# Patient Record
Sex: MALE | Race: OTHER | HISPANIC OR LATINO | URBAN - METROPOLITAN AREA
[De-identification: names, ages, dates, MRNs, and addresses within clinical notes are randomized per-mention and may not be internally consistent; named-entity substitution may affect disease eponyms.]

---

## 2020-06-29 ENCOUNTER — OFFICE VISIT (OUTPATIENT)
Dept: URBAN - METROPOLITAN AREA CLINIC 63 | Facility: CLINIC | Age: 45
End: 2020-06-29

## 2022-06-04 ENCOUNTER — TELEPHONE ENCOUNTER (OUTPATIENT)
Dept: URBAN - METROPOLITAN AREA CLINIC 68 | Facility: CLINIC | Age: 47
End: 2022-06-04

## 2022-06-05 ENCOUNTER — TELEPHONE ENCOUNTER (OUTPATIENT)
Dept: URBAN - METROPOLITAN AREA CLINIC 68 | Facility: CLINIC | Age: 47
End: 2022-06-05

## 2022-06-25 ENCOUNTER — TELEPHONE ENCOUNTER (OUTPATIENT)
Age: 47
End: 2022-06-25

## 2022-06-26 ENCOUNTER — TELEPHONE ENCOUNTER (OUTPATIENT)
Age: 47
End: 2022-06-26

## 2022-07-09 ENCOUNTER — TELEPHONE ENCOUNTER (OUTPATIENT)
Dept: URBAN - METROPOLITAN AREA CLINIC 121 | Facility: CLINIC | Age: 47
End: 2022-07-09

## 2022-07-09 RX ORDER — SUCRALFATE 1 G/1
TABLET ORAL TWICE A DAY
Refills: 0 | OUTPATIENT
Start: 2019-12-04 | End: 2020-03-16

## 2022-07-09 RX ORDER — OMEPRAZOLE 20 MG/1
ONCE A DAY CAPSULE, DELAYED RELEASE ORAL ONCE A DAY
Refills: 3 | OUTPATIENT
Start: 2019-12-04 | End: 2020-03-16

## 2022-07-10 ENCOUNTER — TELEPHONE ENCOUNTER (OUTPATIENT)
Dept: URBAN - METROPOLITAN AREA CLINIC 121 | Facility: CLINIC | Age: 47
End: 2022-07-10

## 2022-07-10 RX ORDER — SUCRALFATE 1 G/1
TWICE A DAY TABLET ORAL TWICE A DAY
Refills: 2 | Status: ACTIVE | COMMUNITY
Start: 2020-03-16

## 2022-07-10 RX ORDER — SUCRALFATE 1 G/1
TWICE A DAY TABLET ORAL TWICE A DAY
Refills: 2 | Status: ACTIVE | COMMUNITY
Start: 2019-12-04

## 2022-07-10 RX ORDER — OMEPRAZOLE 20 MG/1
ONCE A DAY CAPSULE, DELAYED RELEASE ORAL ONCE A DAY
Refills: 2 | Status: ACTIVE | COMMUNITY
Start: 2020-03-16

## 2023-02-09 ENCOUNTER — LAB OUTSIDE AN ENCOUNTER (OUTPATIENT)
Dept: URBAN - METROPOLITAN AREA CLINIC 60 | Facility: CLINIC | Age: 48
End: 2023-02-09

## 2023-02-09 ENCOUNTER — WEB ENCOUNTER (OUTPATIENT)
Dept: URBAN - METROPOLITAN AREA CLINIC 60 | Facility: CLINIC | Age: 48
End: 2023-02-09

## 2023-02-09 ENCOUNTER — OFFICE VISIT (OUTPATIENT)
Dept: URBAN - METROPOLITAN AREA CLINIC 60 | Facility: CLINIC | Age: 48
End: 2023-02-09
Payer: COMMERCIAL

## 2023-02-09 VITALS
SYSTOLIC BLOOD PRESSURE: 120 MMHG | BODY MASS INDEX: 26.2 KG/M2 | HEART RATE: 72 BPM | OXYGEN SATURATION: 98 % | RESPIRATION RATE: 20 BRPM | TEMPERATURE: 98 F | DIASTOLIC BLOOD PRESSURE: 80 MMHG | WEIGHT: 163 LBS | HEIGHT: 66 IN

## 2023-02-09 DIAGNOSIS — K44.9 HIATAL HERNIA: ICD-10-CM

## 2023-02-09 DIAGNOSIS — K21.9 GASTROESOPHAGEAL REFLUX DISEASE WITHOUT ESOPHAGITIS: ICD-10-CM

## 2023-02-09 DIAGNOSIS — R13.14 PHARYNGOESOPHAGEAL DYSPHAGIA: ICD-10-CM

## 2023-02-09 DIAGNOSIS — M54.2 NECK PAIN: ICD-10-CM

## 2023-02-09 DIAGNOSIS — K29.00 ACUTE GASTRITIS WITHOUT HEMORRHAGE, UNSPECIFIED GASTRITIS TYPE: ICD-10-CM

## 2023-02-09 PROBLEM — 40739000: Status: ACTIVE | Noted: 2023-02-09

## 2023-02-09 PROCEDURE — 99214 OFFICE O/P EST MOD 30 MIN: CPT | Performed by: NURSE PRACTITIONER

## 2023-02-09 PROCEDURE — 99244 OFF/OP CNSLTJ NEW/EST MOD 40: CPT | Performed by: NURSE PRACTITIONER

## 2023-02-09 RX ORDER — SUCRALFATE 1 G/1
1 TABLET ON AN EMPTY STOMACH TABLET ORAL TWICE A DAY
Qty: 180 TABLET | Refills: 0 | OUTPATIENT
Start: 2023-02-09 | End: 2023-05-10

## 2023-02-09 RX ORDER — OMEPRAZOLE 20 MG/1
ONCE A DAY CAPSULE, DELAYED RELEASE ORAL ONCE A DAY
Refills: 2 | Status: ACTIVE | COMMUNITY
Start: 2020-03-16

## 2023-02-09 RX ORDER — SUCRALFATE 1 G/1
1 TABLET ON AN EMPTY STOMACH TABLET ORAL TWICE A DAY
Qty: 180 TABLET | Refills: 0 | OUTPATIENT

## 2023-02-09 NOTE — PHYSICAL EXAM GASTROINTESTINAL
Abdomen: Soft, epigastric tenderness, nondistended , no guarding or rigidity , no masses palpable , normal bowel sounds , Liver and Spleen , no hepatomegaly present , no hepatosplenomegaly , liver nontender , spleen not palpable

## 2023-02-09 NOTE — HPI-HPI
11/19 Patient comes for EGD, and colonoscopy with personal h/o GERD  and   had colonoscopy every 2 years, Patient denies rectal bleeding or colonic polyps. Will need records, will plan EGD and colonoscopy. Will start diet and PPI. 12/19 Patient colonoscopy with evidence of mild diverticular disease of the sigmoid colon, internal hemorrhoids, the examined portion of the ileum was normal, and the entire examined colon is normal.  Biopsy: Random colon biopsy  mucosa with no specific pathologic alteration, terminal ileum biopsy, small intestinal mucosa with no specific pathologic alteration.  EGD with evidence of a small H hernia.  Duodenal mucosa with normal with no specific pathologic alteration.  Antrum, body, and stomach biopsy showed mild chronic inflammation, negative for H. pylori.  Lower esophageal mucosa with features of reflux, negative for intestinal metaplastic. Patient will do diet and treatment below.  Patient will do diet and treatment below.  3/20 Patient here today in good general state he is complaining of having gastritis and reflux symptoms.  Patient admits not been doing diet or proper treatment for gastritis or GERD. Plan: Patient is agreed to resume treatment with Carafate 1 g twice a day, and omeprazole 20 mg once a day, he will do proper diet for gastritis and reflux. 2/23 Patient here today complaining of having difficulty to swallow.  Patient started having some candidiasis infection around 2 months ago.  Received treatment for it and symptoms subside.  After that treatment he noticed neck pain, chest pain and difficulty to swallow.  He has an EGD done 3 years ago the procedure shows evidence of small hiatal hernia, mild chronic inflammation of the stomach, lower esophageal mucosa with features of reflux, negative for intestinal metaplasia.  He is also complaining of symptoms of reflux and gastritis.  On omeprazole 20 mg daily with no resolution of his symptoms. We will do EGD, resume diet and treatment with omeprazole 20 mg once a day, and we will add Carafate 1 g  twice a day.  Neck ultrasound and esophagram.

## 2023-02-13 ENCOUNTER — OFFICE VISIT (OUTPATIENT)
Dept: URBAN - METROPOLITAN AREA SURGERY CENTER 4 | Facility: SURGERY CENTER | Age: 48
End: 2023-02-13
Payer: COMMERCIAL

## 2023-02-13 ENCOUNTER — CLAIMS CREATED FROM THE CLAIM WINDOW (OUTPATIENT)
Dept: URBAN - METROPOLITAN AREA CLINIC 4 | Facility: CLINIC | Age: 48
End: 2023-02-13
Payer: COMMERCIAL

## 2023-02-13 DIAGNOSIS — K21.9 GASTROESOPHAGEAL REFLUX DISEASE WITHOUT ESOPHAGITIS: ICD-10-CM

## 2023-02-13 DIAGNOSIS — R13.10 DYSPHAGIA: ICD-10-CM

## 2023-02-13 DIAGNOSIS — K31.89 OTHER DISEASES OF STOMACH AND DUODENUM: ICD-10-CM

## 2023-02-13 DIAGNOSIS — K22.70 BARRETT'S ESOPHAGUS WITHOUT DYSPLASIA: ICD-10-CM

## 2023-02-13 DIAGNOSIS — K29.50 UNSPECIFIED CHRONIC GASTRITIS WITHOUT BLEEDING: ICD-10-CM

## 2023-02-13 PROCEDURE — 88305 TISSUE EXAM BY PATHOLOGIST: CPT | Performed by: PATHOLOGY

## 2023-02-13 PROCEDURE — 43239 EGD BIOPSY SINGLE/MULTIPLE: CPT | Performed by: INTERNAL MEDICINE

## 2023-02-13 PROCEDURE — 88312 SPECIAL STAINS GROUP 1: CPT | Performed by: PATHOLOGY

## 2023-02-13 RX ORDER — OMEPRAZOLE 20 MG/1
ONCE A DAY CAPSULE, DELAYED RELEASE ORAL ONCE A DAY
Refills: 2 | Status: ACTIVE | COMMUNITY
Start: 2020-03-16

## 2023-02-13 RX ORDER — SUCRALFATE 1 G/1
1 TABLET ON AN EMPTY STOMACH TABLET ORAL TWICE A DAY
Qty: 180 TABLET | Refills: 0 | Status: ACTIVE | COMMUNITY
Start: 2023-02-09 | End: 2023-05-10

## 2023-02-13 RX ORDER — SUCRALFATE 1 G/1
1 TABLET ON AN EMPTY STOMACH TABLET ORAL TWICE A DAY
Qty: 180 TABLET | Refills: 0 | Status: ACTIVE | COMMUNITY

## 2023-02-14 PROBLEM — 40739000 DYSPHAGIA: Status: ACTIVE | Noted: 2023-02-14

## 2023-02-14 PROBLEM — 266435005: Status: ACTIVE | Noted: 2023-02-09

## 2023-02-14 PROBLEM — 302914006 BARRETT'S ESOPHAGUS: Status: ACTIVE | Noted: 2023-02-14

## 2023-02-16 ENCOUNTER — LAB OUTSIDE AN ENCOUNTER (OUTPATIENT)
Dept: URBAN - METROPOLITAN AREA CLINIC 60 | Facility: CLINIC | Age: 48
End: 2023-02-16

## 2023-02-22 ENCOUNTER — OFFICE VISIT (OUTPATIENT)
Dept: URBAN - METROPOLITAN AREA CLINIC 57 | Facility: CLINIC | Age: 48
End: 2023-02-22

## 2023-02-24 ENCOUNTER — OFFICE VISIT (OUTPATIENT)
Dept: URBAN - METROPOLITAN AREA CLINIC 63 | Facility: CLINIC | Age: 48
End: 2023-02-24

## 2023-03-08 ENCOUNTER — OFFICE VISIT (OUTPATIENT)
Dept: URBAN - METROPOLITAN AREA CLINIC 60 | Facility: CLINIC | Age: 48
End: 2023-03-08

## 2023-03-08 ENCOUNTER — OFFICE VISIT (OUTPATIENT)
Dept: URBAN - METROPOLITAN AREA CLINIC 63 | Facility: CLINIC | Age: 48
End: 2023-03-08

## 2023-03-08 RX ORDER — SUCRALFATE 1 G/1
1 TABLET ON AN EMPTY STOMACH TABLET ORAL TWICE A DAY
Qty: 180 TABLET | Refills: 0 | Status: ACTIVE | COMMUNITY

## 2023-03-08 RX ORDER — OMEPRAZOLE 20 MG/1
ONCE A DAY CAPSULE, DELAYED RELEASE ORAL ONCE A DAY
Refills: 2 | Status: ACTIVE | COMMUNITY
Start: 2020-03-16

## 2023-03-08 RX ORDER — SUCRALFATE 1 G/1
1 TABLET ON AN EMPTY STOMACH TABLET ORAL TWICE A DAY
Qty: 180 TABLET | Refills: 0 | Status: ACTIVE | COMMUNITY
Start: 2023-02-09 | End: 2023-05-10

## 2023-03-22 ENCOUNTER — OFFICE VISIT (OUTPATIENT)
Dept: URBAN - METROPOLITAN AREA CLINIC 60 | Facility: CLINIC | Age: 48
End: 2023-03-22
Payer: COMMERCIAL

## 2023-03-22 VITALS
SYSTOLIC BLOOD PRESSURE: 120 MMHG | HEIGHT: 66 IN | RESPIRATION RATE: 20 BRPM | BODY MASS INDEX: 26.52 KG/M2 | OXYGEN SATURATION: 99 % | TEMPERATURE: 97.4 F | WEIGHT: 165 LBS | DIASTOLIC BLOOD PRESSURE: 80 MMHG | HEART RATE: 94 BPM

## 2023-03-22 DIAGNOSIS — K21.9 GASTROESOPHAGEAL REFLUX DISEASE WITHOUT ESOPHAGITIS: ICD-10-CM

## 2023-03-22 DIAGNOSIS — K29.00 ACUTE GASTRITIS WITHOUT HEMORRHAGE, UNSPECIFIED GASTRITIS TYPE: ICD-10-CM

## 2023-03-22 DIAGNOSIS — K44.9 HIATAL HERNIA: ICD-10-CM

## 2023-03-22 DIAGNOSIS — R13.14 PHARYNGOESOPHAGEAL DYSPHAGIA: ICD-10-CM

## 2023-03-22 PROCEDURE — 99214 OFFICE O/P EST MOD 30 MIN: CPT | Performed by: NURSE PRACTITIONER

## 2023-03-22 RX ORDER — OMEPRAZOLE 20 MG/1
1 CAPSULE 30 MINUTES BEFORE MORNING MEAL CAPSULE, DELAYED RELEASE ORAL ONCE A DAY
Qty: 90 CAPSULES | Refills: 0 | OUTPATIENT
Start: 2023-03-22

## 2023-03-22 RX ORDER — SUCRALFATE 1 G/1
1 TABLET ON AN EMPTY STOMACH TABLET ORAL TWICE A DAY
Qty: 180 TABLET | Refills: 0 | Status: ACTIVE | COMMUNITY
Start: 2023-02-09 | End: 2023-05-10

## 2023-03-22 RX ORDER — SUCRALFATE 1 G/1
1 TABLET ON AN EMPTY STOMACH TABLET ORAL TWICE A DAY
Qty: 180 TABLET | Refills: 0 | OUTPATIENT

## 2023-03-22 RX ORDER — OMEPRAZOLE 20 MG/1
ONCE A DAY CAPSULE, DELAYED RELEASE ORAL ONCE A DAY
Refills: 2 | Status: ACTIVE | COMMUNITY
Start: 2020-03-16

## 2023-03-22 NOTE — HPI-HPI
11/19 Patient comes for EGD, and colonoscopy with personal h/o GERD  and   had colonoscopy every 2 years, Patient denies rectal bleeding or colonic polyps. Will need records, will plan EGD and colonoscopy. Will start diet and PPI. 12/19 Patient colonoscopy with evidence of mild diverticular disease of the sigmoid colon, internal hemorrhoids, the examined portion of the ileum was normal, and the entire examined colon is normal.  Biopsy: Random colon biopsy  mucosa with no specific pathologic alteration, terminal ileum biopsy, small intestinal mucosa with no specific pathologic alteration.  EGD with evidence of a small H hernia.  Duodenal mucosa with normal with no specific pathologic alteration.  Antrum, body, and stomach biopsy showed mild chronic inflammation, negative for H. pylori.  Lower esophageal mucosa with features of reflux, negative for intestinal metaplastic. Patient will do diet and treatment below.  Patient will do diet and treatment below.  3/20 Patient here today in good general state he is complaining of having gastritis and reflux symptoms.  Patient admits not been doing diet or proper treatment for gastritis or GERD. Plan: Patient is agreed to resume treatment with Carafate 1 g twice a day, and omeprazole 20 mg once a day, he will do proper diet for gastritis and reflux. 2/23 Patient here today complaining of having difficulty to swallow.  Patient started having some candidiasis infection around 2 months ago.  Received treatment for it and symptoms subside.  After that treatment he noticed neck pain, chest pain and difficulty to swallow.  He has an EGD done 3 years ago the procedure shows evidence of small hiatal hernia, mild chronic inflammation of the stomach, lower esophageal mucosa with features of reflux, negative for intestinal metaplasia.  He is also complaining of symptoms of reflux and gastritis.  On omeprazole 20 mg daily with no resolution of his symptoms. We will do EGD, resume diet and treatment with omeprazole 20 mg once a day, and we will add Carafate 1 g  twice a day.  Neck ultrasound and esophagram.  3/23   Patient EGD shows evidence esophageal mucosal changes suspicious for short segment of [place] esophagus, small hiatal hernia, chronic gastritis biopsy results duodenal mucosa with no significant abnormality.  Stomach, antrum, and body biopsy negative for H. pylori organism or intestinal metaplasia, dysplasia, malignancy.  Positive for foveolar hyperplasia.  No other esophageal mucosa with reflux changes, negative for [place]'s esophagus or eosinophilic esophagitis Neck ultrasound shows evidence of no enlarged cervical nodes, multiple thyroid.

## 2023-03-23 ENCOUNTER — TELEPHONE ENCOUNTER (OUTPATIENT)
Dept: URBAN - METROPOLITAN AREA CLINIC 63 | Facility: CLINIC | Age: 48
End: 2023-03-23

## 2023-03-23 RX ORDER — OMEPRAZOLE 20 MG/1
1 CAPSULE 30 MINUTES BEFORE MORNING MEAL CAPSULE, DELAYED RELEASE ORAL ONCE A DAY
Qty: 90 | Refills: 0 | OUTPATIENT
Start: 2023-03-22

## 2023-03-23 RX ORDER — SUCRALFATE 1 G/1
1 TABLET ON AN EMPTY STOMACH TABLET ORAL TWICE A DAY
Qty: 180 | Refills: 0 | OUTPATIENT

## 2023-03-24 ENCOUNTER — TELEPHONE ENCOUNTER (OUTPATIENT)
Dept: URBAN - METROPOLITAN AREA CLINIC 60 | Facility: CLINIC | Age: 48
End: 2023-03-24

## 2023-04-17 ENCOUNTER — OFFICE VISIT (OUTPATIENT)
Dept: URBAN - METROPOLITAN AREA CLINIC 63 | Facility: CLINIC | Age: 48
End: 2023-04-17

## 2023-04-25 ENCOUNTER — APPOINTMENT (RX ONLY)
Dept: URBAN - METROPOLITAN AREA CLINIC 335 | Facility: CLINIC | Age: 48
Setting detail: DERMATOLOGY
End: 2023-04-25

## 2023-04-25 DIAGNOSIS — D17 BENIGN LIPOMATOUS NEOPLASM: ICD-10-CM | Status: INADEQUATELY CONTROLLED

## 2023-04-25 DIAGNOSIS — L57.3 POIKILODERMA OF CIVATTE: ICD-10-CM | Status: STABLE

## 2023-04-25 DIAGNOSIS — L72.8 OTHER FOLLICULAR CYSTS OF THE SKIN AND SUBCUTANEOUS TISSUE: ICD-10-CM | Status: INADEQUATELY CONTROLLED

## 2023-04-25 DIAGNOSIS — L81.1 CHLOASMA: ICD-10-CM | Status: INADEQUATELY CONTROLLED

## 2023-04-25 PROBLEM — D17.1 BENIGN LIPOMATOUS NEOPLASM OF SKIN AND SUBCUTANEOUS TISSUE OF TRUNK: Status: ACTIVE | Noted: 2023-04-25

## 2023-04-25 PROCEDURE — ? REFERRAL

## 2023-04-25 PROCEDURE — ? PRODUCT LINE (REVISION)

## 2023-04-25 PROCEDURE — ? COUNSELING

## 2023-04-25 PROCEDURE — ? PRESCRIPTION MEDICATION MANAGEMENT

## 2023-04-25 PROCEDURE — 99203 OFFICE O/P NEW LOW 30 MIN: CPT

## 2023-04-25 PROCEDURE — ? PRESCRIPTION

## 2023-04-25 RX ORDER — CLINDAMYCIN PHOSPHATE 10 MG/ML
SOLUTION TOPICAL
Qty: 60 | Refills: 1 | Status: ERX | COMMUNITY
Start: 2023-04-25

## 2023-04-25 RX ADMIN — CLINDAMYCIN PHOSPHATE: 10 SOLUTION TOPICAL at 00:00

## 2023-04-25 ASSESSMENT — LOCATION SIMPLE DESCRIPTION DERM
LOCATION SIMPLE: LEFT CHEEK
LOCATION SIMPLE: NECK
LOCATION SIMPLE: RIGHT UPPER BACK
LOCATION SIMPLE: LEFT AXILLARY VAULT

## 2023-04-25 ASSESSMENT — LOCATION ZONE DERM
LOCATION ZONE: FACE
LOCATION ZONE: AXILLAE
LOCATION ZONE: TRUNK
LOCATION ZONE: NECK

## 2023-04-25 ASSESSMENT — LOCATION DETAILED DESCRIPTION DERM
LOCATION DETAILED: LEFT AXILLARY VAULT
LOCATION DETAILED: LEFT INFERIOR PREAURICULAR CHEEK
LOCATION DETAILED: RIGHT CENTRAL LATERAL NECK
LOCATION DETAILED: RIGHT INFERIOR UPPER BACK
LOCATION DETAILED: LEFT SUPERIOR LATERAL MALAR CHEEK

## 2023-04-25 NOTE — HPI: CYST
Is This A New Presentation, Or A Follow-Up?: Cyst
Additional History: Treated before with antibiotics.

## 2023-04-25 NOTE — PROCEDURE: PRODUCT LINE (REVISION)
Product 21 Units: 0
Product 3 Price (In Dollars - Numeric Only, No Special Characters Or $): 0.00
Risk Of Complication Category: No MDM
Name Of Product 9: Youthful Lip Replenisher
Name Of Product 4: Nectifirm
Name Of Product 8: Retinol complete .1%
Allow Plan To Count Towards E/M Coding: Yes
Name Of Product 5: Gentle cleansing Lotion
Name Of Product 1: C+ Correcting complex 30%
Product 1 Price (In Dollars - Numeric Only, No Special Characters Or $): 170.00
Product 1 Units: 1
Name Of Product 6: Bodi Firm 3.8 oz
Product 1 Application Directions: Use morning and evening after cleansing, but before moisturizing
Name Of Product 2: Soothing Facial Rinse
Detail Level: Zone
Name Of Product 7: Retinol Complete 0.5
Assigning Risk Information: Per AMA, level of risk is based upon consequences of the problem(s) addressed at the encounter when appropriately treated. Risk also includes medical decision making related to the need to initiate or forego further testing, treatment and/or hospitalization. Over the counter medication are assigned a risk level of low. Prescription medication management is assigned a risk level of moderate.
Name Of Product 3: Revox Line relaxer

## 2023-04-25 NOTE — PROCEDURE: PRESCRIPTION MEDICATION MANAGEMENT
Render In Strict Bullet Format?: No
Detail Level: Zone
Initiate Treatment: Cerave cream and soap \\nDelta md sunscreen

## 2023-06-21 ENCOUNTER — LAB OUTSIDE AN ENCOUNTER (OUTPATIENT)
Dept: URBAN - METROPOLITAN AREA CLINIC 60 | Facility: CLINIC | Age: 48
End: 2023-06-21

## 2023-06-21 ENCOUNTER — OFFICE VISIT (OUTPATIENT)
Dept: URBAN - METROPOLITAN AREA CLINIC 60 | Facility: CLINIC | Age: 48
End: 2023-06-21
Payer: COMMERCIAL

## 2023-06-21 VITALS
SYSTOLIC BLOOD PRESSURE: 120 MMHG | DIASTOLIC BLOOD PRESSURE: 72 MMHG | RESPIRATION RATE: 20 BRPM | HEART RATE: 72 BPM | BODY MASS INDEX: 26.36 KG/M2 | OXYGEN SATURATION: 97 % | TEMPERATURE: 97.9 F | HEIGHT: 66 IN | WEIGHT: 164 LBS

## 2023-06-21 DIAGNOSIS — R13.19 OTHER DYSPHAGIA: ICD-10-CM

## 2023-06-21 DIAGNOSIS — Z12.11 COLON CANCER SCREENING: ICD-10-CM

## 2023-06-21 DIAGNOSIS — K21.9 GASTROESOPHAGEAL REFLUX DISEASE WITHOUT ESOPHAGITIS: ICD-10-CM

## 2023-06-21 DIAGNOSIS — K29.50 OTHER CHRONIC GASTRITIS WITHOUT HEMORRHAGE: ICD-10-CM

## 2023-06-21 PROBLEM — 8493009: Status: ACTIVE | Noted: 2023-06-21

## 2023-06-21 PROCEDURE — 99214 OFFICE O/P EST MOD 30 MIN: CPT | Performed by: NURSE PRACTITIONER

## 2023-06-21 RX ORDER — PANTOPRAZOLE SODIUM 40 MG/1
1 TABLET TABLET, DELAYED RELEASE ORAL ONCE A DAY
Status: ACTIVE | COMMUNITY

## 2023-06-21 RX ORDER — SUCRALFATE 1 G/1
1 TABLET ON AN EMPTY STOMACH TABLET ORAL TWICE A DAY
Qty: 180 | Refills: 0 | Status: ACTIVE | COMMUNITY

## 2023-06-21 RX ORDER — OMEPRAZOLE 20 MG/1
1 CAPSULE 30 MINUTES BEFORE MORNING MEAL CAPSULE, DELAYED RELEASE ORAL ONCE A DAY
Qty: 90 | Refills: 0 | OUTPATIENT

## 2023-06-21 RX ORDER — SUCRALFATE 1 G/1
1 TABLET ON AN EMPTY STOMACH TABLET ORAL TWICE A DAY
Qty: 180 | Refills: 0 | OUTPATIENT

## 2023-06-21 NOTE — HPI-HPI
11/19 Patient comes for EGD, and colonoscopy with personal h/o GERD  and   had colonoscopy every 2 years, Patient denies rectal bleeding or colonic polyps. Will need records, will plan EGD and colonoscopy. Will start diet and PPI. 12/19 Patient colonoscopy with evidence of mild diverticular disease of the sigmoid colon, internal hemorrhoids, the examined portion of the ileum was normal, and the entire examined colon is normal.  Biopsy: Random colon biopsy  mucosa with no specific pathologic alteration, terminal ileum biopsy, small intestinal mucosa with no specific pathologic alteration.  EGD with evidence of a small H hernia.  Duodenal mucosa with normal with no specific pathologic alteration.  Antrum, body, and stomach biopsy showed mild chronic inflammation, negative for H. pylori.  Lower esophageal mucosa with features of reflux, negative for intestinal metaplastic. Patient will do diet and treatment below.  Patient will do diet and treatment below.  3/20 Patient here today in good general state he is complaining of having gastritis and reflux symptoms.  Patient admits not been doing diet or proper treatment for gastritis or GERD. Plan: Patient is agreed to resume treatment with Carafate 1 g twice a day, and omeprazole 20 mg once a day, he will do proper diet for gastritis and reflux. 2/23 Patient here today complaining of having difficulty to swallow.  Patient started having some candidiasis infection around 2 months ago.  Received treatment for it and symptoms subside.  After that treatment he noticed neck pain, chest pain and difficulty to swallow.  He has an EGD done 3 years ago the procedure shows evidence of small hiatal hernia, mild chronic inflammation of the stomach, lower esophageal mucosa with features of reflux, negative for intestinal metaplasia.  He is also complaining of symptoms of reflux and gastritis.  On omeprazole 20 mg daily with no resolution of his symptoms. We will do EGD, resume diet and treatment with omeprazole 20 mg once a day, and we will add Carafate 1 g  twice a day.  Neck ultrasound and esophagram.  3/23   Patient EGD shows evidence esophageal mucosal changes suspicious for short segment of [place] esophagus, small hiatal hernia, chronic gastritis biopsy results duodenal mucosa with no significant abnormality.  Stomach, antrum, and body biopsy negative for H. pylori organism or intestinal metaplasia, dysplasia, malignancy.  Positive for foveolar hyperplasia.  No other esophageal mucosa with reflux changes, negative for [place]'s esophagus or eosinophilic esophagitis Neck ultrasound shows evidence of no enlarged cervical nodes, multiple thyroid. 6/23 Patient also is complaining of having patient here today in good general state.  He says symptoms of gastritis and reflux are better still having dysphagia and right-sided neck pain/discomfort especially when he swallows.  Patient has visited ENT he was told that no abnormalities were found at the physical exam.  Barium swallow shows evidence of normal barium esophagram.  Patient also here for his screening colonoscopy.  He never had colonoscopy done denies any personal family history of colorectal cancer, rectal bleeding or change in her bowel habits. We do neck MRI with contrast continue with treatment for GERD and gastritis with diet, Carafate, and omeprazole. Colonoscopy.

## 2023-06-26 ENCOUNTER — TELEPHONE ENCOUNTER (OUTPATIENT)
Dept: URBAN - METROPOLITAN AREA CLINIC 63 | Facility: CLINIC | Age: 48
End: 2023-06-26

## 2023-06-26 ENCOUNTER — LAB OUTSIDE AN ENCOUNTER (OUTPATIENT)
Dept: URBAN - METROPOLITAN AREA CLINIC 63 | Facility: CLINIC | Age: 48
End: 2023-06-26

## 2023-06-28 ENCOUNTER — LAB OUTSIDE AN ENCOUNTER (OUTPATIENT)
Dept: URBAN - METROPOLITAN AREA CLINIC 60 | Facility: CLINIC | Age: 48
End: 2023-06-28

## 2023-08-18 ENCOUNTER — OUT OF OFFICE VISIT (OUTPATIENT)
Dept: URBAN - METROPOLITAN AREA SURGERY CENTER 4 | Facility: SURGERY CENTER | Age: 48
End: 2023-08-18
Payer: COMMERCIAL

## 2023-08-18 ENCOUNTER — CLAIMS CREATED FROM THE CLAIM WINDOW (OUTPATIENT)
Dept: URBAN - METROPOLITAN AREA CLINIC 4 | Facility: CLINIC | Age: 48
End: 2023-08-18
Payer: COMMERCIAL

## 2023-08-18 DIAGNOSIS — K57.30 DIVERTCULOSIS OF LG INT W/O PERFORATION OR ABSCESS W/O BLEEDING: ICD-10-CM

## 2023-08-18 DIAGNOSIS — K64.1 INTERNAL HEMORRHOIDS, GRADE II: ICD-10-CM

## 2023-08-18 DIAGNOSIS — K63.5 BENIGN COLON POLYPS: ICD-10-CM

## 2023-08-18 DIAGNOSIS — D12.2 BENIGN NEOPLASM OF ASCENDING COLON: ICD-10-CM

## 2023-08-18 DIAGNOSIS — D12.3 BENIGN NEOPLASM OF TRANSVERSE COLON: ICD-10-CM

## 2023-08-18 DIAGNOSIS — Z12.11 COLON CANCER SCREENING (HIGH RISK): ICD-10-CM

## 2023-08-18 DIAGNOSIS — Z12.11 COLON CANCER SCREENING: ICD-10-CM

## 2023-08-18 DIAGNOSIS — K57.30 DIVERTICULA OF COLON: ICD-10-CM

## 2023-08-18 PROCEDURE — 88305 TISSUE EXAM BY PATHOLOGIST: CPT | Performed by: PATHOLOGY

## 2023-08-18 PROCEDURE — 45380 COLONOSCOPY AND BIOPSY: CPT | Performed by: INTERNAL MEDICINE

## 2023-08-18 PROCEDURE — 00811 ANES LWR INTST NDSC NOS: CPT | Performed by: NURSE ANESTHETIST, CERTIFIED REGISTERED

## 2023-08-18 RX ORDER — SUCRALFATE 1 G/1
1 TABLET ON AN EMPTY STOMACH TABLET ORAL TWICE A DAY
Qty: 180 | Refills: 0 | Status: ACTIVE | COMMUNITY

## 2023-08-18 RX ORDER — OMEPRAZOLE 20 MG/1
1 CAPSULE 30 MINUTES BEFORE MORNING MEAL CAPSULE, DELAYED RELEASE ORAL ONCE A DAY
Qty: 90 | Refills: 0 | Status: ACTIVE | COMMUNITY

## 2023-08-18 RX ORDER — PANTOPRAZOLE SODIUM 40 MG/1
1 TABLET TABLET, DELAYED RELEASE ORAL ONCE A DAY
Status: ACTIVE | COMMUNITY

## 2023-09-11 ENCOUNTER — OFFICE VISIT (OUTPATIENT)
Dept: URBAN - METROPOLITAN AREA CLINIC 63 | Facility: CLINIC | Age: 48
End: 2023-09-11
Payer: COMMERCIAL

## 2023-09-11 VITALS
HEIGHT: 66 IN | HEART RATE: 82 BPM | TEMPERATURE: 98.2 F | DIASTOLIC BLOOD PRESSURE: 80 MMHG | WEIGHT: 172 LBS | OXYGEN SATURATION: 97 % | BODY MASS INDEX: 27.64 KG/M2 | SYSTOLIC BLOOD PRESSURE: 128 MMHG

## 2023-09-11 DIAGNOSIS — R10.13 EPIGASTRIC PAIN: ICD-10-CM

## 2023-09-11 DIAGNOSIS — K44.9 HIATAL HERNIA: ICD-10-CM

## 2023-09-11 DIAGNOSIS — R13.19 OTHER DYSPHAGIA: ICD-10-CM

## 2023-09-11 DIAGNOSIS — K64.1 GRADE II HEMORRHOIDS: ICD-10-CM

## 2023-09-11 DIAGNOSIS — K21.9 GASTROESOPHAGEAL REFLUX DISEASE WITHOUT ESOPHAGITIS: ICD-10-CM

## 2023-09-11 DIAGNOSIS — D12.6 ADENOMATOUS POLYP OF COLON, UNSPECIFIED PART OF COLON: ICD-10-CM

## 2023-09-11 DIAGNOSIS — K29.50 OTHER CHRONIC GASTRITIS WITHOUT HEMORRHAGE: ICD-10-CM

## 2023-09-11 PROCEDURE — 99214 OFFICE O/P EST MOD 30 MIN: CPT | Performed by: NURSE PRACTITIONER

## 2023-09-11 RX ORDER — OMEPRAZOLE 20 MG/1
1 CAPSULE 30 MINUTES BEFORE MORNING MEAL CAPSULE, DELAYED RELEASE ORAL ONCE A DAY
Qty: 90 | Refills: 0 | Status: ACTIVE | COMMUNITY

## 2023-09-11 RX ORDER — SUCRALFATE 1 G/1
1 TABLET ON AN EMPTY STOMACH TABLET ORAL TWICE A DAY
Qty: 180 | Refills: 0 | Status: ACTIVE | COMMUNITY

## 2023-09-11 NOTE — HPI-HPI
11/19 Patient comes for EGD, and colonoscopy with personal h/o GERD  and   had colonoscopy every 2 years, Patient denies rectal bleeding or colonic polyps. Will need records, will plan EGD and colonoscopy. Will start diet and PPI. 12/19 Patient colonoscopy with evidence of mild diverticular disease of the sigmoid colon, internal hemorrhoids, the examined portion of the ileum was normal, and the entire examined colon is normal.  Biopsy: Random colon biopsy  mucosa with no specific pathologic alteration, terminal ileum biopsy, small intestinal mucosa with no specific pathologic alteration.  EGD with evidence of a small H hernia.  Duodenal mucosa with normal with no specific pathologic alteration.  Antrum, body, and stomach biopsy showed mild chronic inflammation, negative for H. pylori.  Lower esophageal mucosa with features of reflux, negative for intestinal metaplastic. Patient will do diet and treatment below.  Patient will do diet and treatment below.  3/20 Patient here today in good general state he is complaining of having gastritis and reflux symptoms.  Patient admits not been doing diet or proper treatment for gastritis or GERD. Plan: Patient is agreed to resume treatment with Carafate 1 g twice a day, and omeprazole 20 mg once a day, he will do proper diet for gastritis and reflux. 2/23 Patient here today complaining of having difficulty to swallow.  Patient started having some candidiasis infection around 2 months ago.  Received treatment for it and symptoms subside.  After that treatment he noticed neck pain, chest pain and difficulty to swallow.  He has an EGD done 3 years ago the procedure shows evidence of small hiatal hernia, mild chronic inflammation of the stomach, lower esophageal mucosa with features of reflux, negative for intestinal metaplasia.  He is also complaining of symptoms of reflux and gastritis.  On omeprazole 20 mg daily with no resolution of his symptoms. We will do EGD, resume diet and treatment with omeprazole 20 mg once a day, and we will add Carafate 1 g  twice a day.  Neck ultrasound and esophagram.  3/23   Patient EGD shows evidence esophageal mucosal changes suspicious for short segment of [place] esophagus, small hiatal hernia, chronic gastritis biopsy results duodenal mucosa with no significant abnormality.  Stomach, antrum, and body biopsy negative for H. pylori organism or intestinal metaplasia, dysplasia, malignancy.  Positive for foveolar hyperplasia.  No other esophageal mucosa with reflux changes, negative for [place]'s esophagus or eosinophilic esophagitis Neck ultrasound shows evidence of no enlarged cervical nodes, multiple thyroid. 6/23 Patient also is complaining of having patient here today in good general state.  He says symptoms of gastritis and reflux are better still having dysphagia and right-sided neck pain/discomfort especially when he swallows.  Patient has visited ENT he was told that no abnormalities were found at the physical exam.  Barium swallow shows evidence of normal barium esophagram.  Patient also here for his screening colonoscopy.  He never had colonoscopy done denies any personal family history of colorectal cancer, rectal bleeding or change in her bowel habits. We do neck MRI with contrast continue with treatment for GERD and gastritis with diet, Carafate, and omeprazole. Colonoscopy. 9/23 Patient colonoscopy shows evidence of a 5 mm tubular adenoma polyp into the ascending colon, a 5 mm tubular adenoma polyp into the transverse colon, mild diverticular disease of the sigmoid and hepatic flexure, and internal hemorrhoids.  EGD shows evidence of esophageal mucosa changes suspicious for short segment of [place] esophagus, small hiatal hernia, chronic gastritis, normal esophagus and examined duodenum.  Biopsy results: Duodenal mucosa with no significant abnormality.  Stomach, antrum body biopsy foveolar hyperplasia, negative for H. pylori, intestinal metaplasia, dysplasia, or malignancy.  Lower third esophageal mucosa with reflux type changes, negative for [place] esophagus, dysplasia, malignancy.  Esophagus middle third biopsy also, shows evidence of squamous mucosa with reflux type changes negative for [place] esophagus, dysplasia, malignancy.  Patient is here complaining of symptom of gastritis.  Epigastric pain that radiate to the back.  He will have laboratory to check on pancreas and liver function.  We may do CT abdomen upon patient clinical course and laboratory results.  Patient will continue with diet and treatment for gastritis and reflux. Colonoscopy in 5 year

## 2023-10-06 LAB
A/G RATIO: 1.9
ALBUMIN: 4.9
ALKALINE PHOSPHATASE: 49
ALT (SGPT): 15
AMYLASE: 39
AST (SGOT): 16
BILIRUBIN, TOTAL: 0.6
BUN/CREATININE RATIO: (no result)
BUN: 15
CALCIUM: 9.8
CARBON DIOXIDE, TOTAL: 28
CHLORIDE: 104
CREATININE: 0.97
EGFR: 96
GLOBULIN, TOTAL: 2.6
GLUCOSE: 94
LIPASE: 15
POTASSIUM: 4.3
PROTEIN, TOTAL: 7.5
SODIUM: 140

## 2023-11-20 ENCOUNTER — OFFICE VISIT (OUTPATIENT)
Dept: URBAN - METROPOLITAN AREA CLINIC 63 | Facility: CLINIC | Age: 48
End: 2023-11-20
Payer: COMMERCIAL

## 2023-11-20 ENCOUNTER — DASHBOARD ENCOUNTERS (OUTPATIENT)
Age: 48
End: 2023-11-20

## 2023-11-20 VITALS
DIASTOLIC BLOOD PRESSURE: 80 MMHG | HEART RATE: 86 BPM | HEIGHT: 66 IN | BODY MASS INDEX: 27 KG/M2 | TEMPERATURE: 97.8 F | WEIGHT: 168 LBS | SYSTOLIC BLOOD PRESSURE: 110 MMHG

## 2023-11-20 DIAGNOSIS — K44.9 HIATAL HERNIA: ICD-10-CM

## 2023-11-20 DIAGNOSIS — K64.1 GRADE II HEMORRHOIDS: ICD-10-CM

## 2023-11-20 DIAGNOSIS — D12.6 ADENOMATOUS POLYP OF COLON, UNSPECIFIED PART OF COLON: ICD-10-CM

## 2023-11-20 DIAGNOSIS — K21.9 GASTROESOPHAGEAL REFLUX DISEASE WITHOUT ESOPHAGITIS: ICD-10-CM

## 2023-11-20 PROCEDURE — 99214 OFFICE O/P EST MOD 30 MIN: CPT | Performed by: NURSE PRACTITIONER

## 2023-11-20 RX ORDER — OMEPRAZOLE 20 MG/1
1 CAPSULE 30 MINUTES BEFORE MORNING MEAL CAPSULE, DELAYED RELEASE ORAL ONCE A DAY
Qty: 90 | Refills: 0 | Status: ACTIVE | COMMUNITY

## 2023-11-20 RX ORDER — TADALAFIL 10 MG/1
1 TABLET AS NEEDED TABLET, FILM COATED ORAL ONCE A DAY
Status: ACTIVE | COMMUNITY

## 2023-11-20 RX ORDER — FAMOTIDINE 20 MG/1
1 TABLET AT BEDTIME AS NEEDED TABLET, FILM COATED ORAL ONCE A DAY
Qty: 90 TABLET | Refills: 0 | OUTPATIENT
Start: 2023-11-20

## 2023-11-20 RX ORDER — OMEPRAZOLE 20 MG/1
1 CAPSULE 30 MINUTES BEFORE MORNING MEAL CAPSULE, DELAYED RELEASE ORAL ONCE A DAY
Qty: 90 CAPSULES | Refills: 0 | OUTPATIENT
Start: 2023-11-20

## 2023-11-20 NOTE — HPI-HPI
11/19 Patient comes for EGD, and colonoscopy with personal h/o GERD  and   had colonoscopy every 2 years, Patient denies rectal bleeding or colonic polyps. Will need records, will plan EGD and colonoscopy. Will start diet and PPI. 12/19 Patient colonoscopy with evidence of mild diverticular disease of the sigmoid colon, internal hemorrhoids, the examined portion of the ileum was normal, and the entire examined colon is normal.  Biopsy: Random colon biopsy  mucosa with no specific pathologic alteration, terminal ileum biopsy, small intestinal mucosa with no specific pathologic alteration.  EGD with evidence of a small H hernia.  Duodenal mucosa with normal with no specific pathologic alteration.  Antrum, body, and stomach biopsy showed mild chronic inflammation, negative for H. pylori.  Lower esophageal mucosa with features of reflux, negative for intestinal metaplastic. Patient will do diet and treatment below.  Patient will do diet and treatment below.  3/20 Patient here today in good general state he is complaining of having gastritis and reflux symptoms.  Patient admits not been doing diet or proper treatment for gastritis or GERD. Plan: Patient is agreed to resume treatment with Carafate 1 g twice a day, and omeprazole 20 mg once a day, he will do proper diet for gastritis and reflux. 2/23 Patient here today complaining of having difficulty to swallow.  Patient started having some candidiasis infection around 2 months ago.  Received treatment for it and symptoms subside.  After that treatment he noticed neck pain, chest pain and difficulty to swallow.  He has an EGD done 3 years ago the procedure shows evidence of small hiatal hernia, mild chronic inflammation of the stomach, lower esophageal mucosa with features of reflux, negative for intestinal metaplasia.  He is also complaining of symptoms of reflux and gastritis.  On omeprazole 20 mg daily with no resolution of his symptoms. We will do EGD, resume diet and treatment with omeprazole 20 mg once a day, and we will add Carafate 1 g  twice a day.  Neck ultrasound and esophagram.  3/23   Patient EGD shows evidence esophageal mucosal changes suspicious for short segment of [place] esophagus, small hiatal hernia, chronic gastritis biopsy results duodenal mucosa with no significant abnormality.  Stomach, antrum, and body biopsy negative for H. pylori organism or intestinal metaplasia, dysplasia, malignancy.  Positive for foveolar hyperplasia.  No other esophageal mucosa with reflux changes, negative for [place]'s esophagus or eosinophilic esophagitis Neck ultrasound shows evidence of no enlarged cervical nodes, multiple thyroid. 6/23 Patient also is complaining of having patient here today in good general state.  He says symptoms of gastritis and reflux are better still having dysphagia and right-sided neck pain/discomfort especially when he swallows.  Patient has visited ENT he was told that no abnormalities were found at the physical exam.  Barium swallow shows evidence of normal barium esophagram.  Patient also here for his screening colonoscopy.  He never had colonoscopy done denies any personal family history of colorectal cancer, rectal bleeding or change in her bowel habits. We do neck MRI with contrast continue with treatment for GERD and gastritis with diet, Carafate, and omeprazole. Colonoscopy. 9/23 Patient colonoscopy shows evidence of a 5 mm tubular adenoma polyp into the ascending colon, a 5 mm tubular adenoma polyp into the transverse colon, mild diverticular disease of the sigmoid and hepatic flexure, and internal hemorrhoids.  EGD shows evidence of esophageal mucosa changes suspicious for short segment of [place] esophagus, small hiatal hernia, chronic gastritis, normal esophagus and examined duodenum.  Biopsy results: Duodenal mucosa with no significant abnormality.  Stomach, antrum body biopsy foveolar hyperplasia, negative for H. pylori, intestinal metaplasia, dysplasia, or malignancy.  Lower third esophageal mucosa with reflux type changes, negative for [place] esophagus, dysplasia, malignancy.  Esophagus middle third biopsy also, shows evidence of squamous mucosa with reflux type changes negative for [place] esophagus, dysplasia, malignancy.  Patient is here complaining of symptom of gastritis.  Epigastric pain that radiate to the back.  He will have laboratory to check on pancreas and liver function.  We may do CT abdomen upon patient clinical course and laboratory results.  Patient will continue with diet and treatment for gastritis and reflux. Colonoscopy in 5 year. 11/23 Patient here today in good general state.  He is here complaining of symptom of gastritis and reflux.  The patient in long term use of PPI.  Has EGD done recently  (please read above) patient will resume diet and treatment for gastritis and reflux.  Will DC Carafate in 4 weeks continue with famotidine 20 mg 1 tablet at bedtime for 6-week.

## 2023-11-20 NOTE — PHYSICAL EXAM GASTROINTESTINAL
Abdomen: Soft, epigastric tenderness, none distended, no guarding or rigidity, no masses palpable, normal bowel sounds, no hepatosplenomegaly.

## 2024-08-09 ENCOUNTER — TELEPHONE ENCOUNTER (OUTPATIENT)
Dept: URBAN - METROPOLITAN AREA CLINIC 59 | Facility: CLINIC | Age: 49
End: 2024-08-09

## 2024-08-16 ENCOUNTER — OFFICE VISIT (OUTPATIENT)
Dept: URBAN - METROPOLITAN AREA CLINIC 60 | Facility: CLINIC | Age: 49
End: 2024-08-16

## 2024-08-16 ENCOUNTER — OFFICE VISIT (OUTPATIENT)
Dept: URBAN - METROPOLITAN AREA CLINIC 60 | Facility: CLINIC | Age: 49
End: 2024-08-16
Payer: COMMERCIAL

## 2024-08-16 VITALS
TEMPERATURE: 97.8 F | DIASTOLIC BLOOD PRESSURE: 80 MMHG | OXYGEN SATURATION: 98 % | WEIGHT: 159 LBS | BODY MASS INDEX: 25.55 KG/M2 | RESPIRATION RATE: 20 BRPM | HEIGHT: 66 IN | SYSTOLIC BLOOD PRESSURE: 120 MMHG | HEART RATE: 81 BPM

## 2024-08-16 DIAGNOSIS — K29.50 OTHER CHRONIC GASTRITIS WITHOUT HEMORRHAGE: ICD-10-CM

## 2024-08-16 PROCEDURE — 99214 OFFICE O/P EST MOD 30 MIN: CPT | Performed by: NURSE PRACTITIONER

## 2024-08-16 RX ORDER — OMEPRAZOLE 20 MG/1
1 CAPSULE 30 MINUTES BEFORE MORNING MEAL CAPSULE, DELAYED RELEASE ORAL ONCE A DAY
Qty: 30 | Refills: 0 | OUTPATIENT
Start: 2024-08-16

## 2024-08-16 RX ORDER — SUCRALFATE 1 G/1
1 TABLET ON AN EMPTY STOMACH TABLET ORAL TWICE A DAY
Qty: 60 TABLET | Refills: 0 | OUTPATIENT
Start: 2024-08-16 | End: 2024-09-15

## 2024-08-16 RX ORDER — FAMOTIDINE 20 MG/1
1 TABLET AT BEDTIME AS NEEDED TABLET, FILM COATED ORAL ONCE A DAY
Qty: 90 TABLET | Refills: 0 | Status: ACTIVE | COMMUNITY
Start: 2023-11-20

## 2024-08-16 RX ORDER — OMEPRAZOLE 20 MG/1
1 CAPSULE 30 MINUTES BEFORE MORNING MEAL CAPSULE, DELAYED RELEASE ORAL ONCE A DAY
Qty: 90 CAPSULES | Refills: 0 | Status: ACTIVE | COMMUNITY
Start: 2023-11-20

## 2024-08-16 RX ORDER — OMEPRAZOLE 20 MG/1
TAKE 1 CAPSULE 30 MINUTES BEFORE MORNING MEAL ONCE A DAY FOR 90 DAYS CAPSULE, DELAYED RELEASE ORAL
Qty: 90 EACH | Refills: 0 | Status: ACTIVE | COMMUNITY

## 2024-08-16 RX ORDER — TADALAFIL 10 MG/1
1 TABLET AS NEEDED TABLET, FILM COATED ORAL ONCE A DAY
Status: ACTIVE | COMMUNITY

## 2024-08-16 RX ORDER — FAMOTIDINE 40 MG/1
1 TABLET AT BEDTIME TABLET, FILM COATED ORAL ONCE A DAY
Status: ACTIVE | COMMUNITY

## 2024-08-16 RX ORDER — OMEPRAZOLE 20 MG/1
1 CAPSULE 30 MINUTES BEFORE MORNING MEAL CAPSULE, DELAYED RELEASE ORAL ONCE A DAY
Qty: 90 | Refills: 0 | Status: ACTIVE | COMMUNITY

## 2024-08-16 NOTE — HPI-HPI
11/19 Patient comes for EGD, and colonoscopy with personal h/o GERD  and   had colonoscopy every 2 years, Patient denies rectal bleeding or colonic polyps. Will need records, will plan EGD and colonoscopy. Will start diet and PPI. 12/19 Patient colonoscopy with evidence of mild diverticular disease of the sigmoid colon, internal hemorrhoids, the examined portion of the ileum was normal, and the entire examined colon is normal.  Biopsy: Random colon biopsy  mucosa with no specific pathologic alteration, terminal ileum biopsy, small intestinal mucosa with no specific pathologic alteration.  EGD with evidence of a small H hernia.  Duodenal mucosa with normal with no specific pathologic alteration.  Antrum, body, and stomach biopsy showed mild chronic inflammation, negative for H. pylori.  Lower esophageal mucosa with features of reflux, negative for intestinal metaplastic. Patient will do diet and treatment below.  Patient will do diet and treatment below.  3/20 Patient here today in good general state he is complaining of having gastritis and reflux symptoms.  Patient admits not been doing diet or proper treatment for gastritis or GERD. Plan: Patient is agreed to resume treatment with Carafate 1 g twice a day, and omeprazole 20 mg once a day, he will do proper diet for gastritis and reflux. 2/23 Patient here today complaining of having difficulty to swallow.  Patient started having some candidiasis infection around 2 months ago.  Received treatment for it and symptoms subside.  After that treatment he noticed neck pain, chest pain and difficulty to swallow.  He has an EGD done 3 years ago the procedure shows evidence of small hiatal hernia, mild chronic inflammation of the stomach, lower esophageal mucosa with features of reflux, negative for intestinal metaplasia.  He is also complaining of symptoms of reflux and gastritis.  On omeprazole 20 mg daily with no resolution of his symptoms. We will do EGD, resume diet and treatment with omeprazole 20 mg once a day, and we will add Carafate 1 g  twice a day.  Neck ultrasound and esophagram.  3/23   Patient EGD shows evidence esophageal mucosal changes suspicious for short segment of [place] esophagus, small hiatal hernia, chronic gastritis biopsy results duodenal mucosa with no significant abnormality.  Stomach, antrum, and body biopsy negative for H. pylori organism or intestinal metaplasia, dysplasia, malignancy.  Positive for foveolar hyperplasia.  No other esophageal mucosa with reflux changes, negative for [place]'s esophagus or eosinophilic esophagitis Neck ultrasound shows evidence of no enlarged cervical nodes, multiple thyroid. 6/23 Patient also is complaining of having patient here today in good general state.  He says symptoms of gastritis and reflux are better still having dysphagia and right-sided neck pain/discomfort especially when he swallows.  Patient has visited ENT he was told that no abnormalities were found at the physical exam.  Barium swallow shows evidence of normal barium esophagram.  Patient also here for his screening colonoscopy.  He never had colonoscopy done denies any personal family history of colorectal cancer, rectal bleeding or change in her bowel habits. We do neck MRI with contrast continue with treatment for GERD and gastritis with diet, Carafate, and omeprazole. Colonoscopy. 9/23 Patient colonoscopy shows evidence of a 5 mm tubular adenoma polyp into the ascending colon, a 5 mm tubular adenoma polyp into the transverse colon, mild diverticular disease of the sigmoid and hepatic flexure, and internal hemorrhoids.  EGD shows evidence of esophageal mucosa changes suspicious for short segment of [place] esophagus, small hiatal hernia, chronic gastritis, normal esophagus and examined duodenum.  Biopsy results: Duodenal mucosa with no significant abnormality.  Stomach, antrum body biopsy foveolar hyperplasia, negative for H. pylori, intestinal metaplasia, dysplasia, or malignancy.  Lower third esophageal mucosa with reflux type changes, negative for [place] esophagus, dysplasia, malignancy.  Esophagus middle third biopsy also, shows evidence of squamous mucosa with reflux type changes negative for [place] esophagus, dysplasia, malignancy.  Patient is here complaining of symptom of gastritis.  Epigastric pain that radiate to the back.  He will have laboratory to check on pancreas and liver function.  We may do CT abdomen upon patient clinical course and laboratory results.  Patient will continue with diet and treatment for gastritis and reflux. Colonoscopy in 5 year. 11/23 Patient here today in good general state.  He is here complaining of symptom of gastritis and reflux.  The patient in long term use of PPI.  Has EGD done recently  (please read above) patient will resume diet and treatment for gastritis and reflux.  Will DC Carafate in 4 weeks continue with famotidine 20 mg 1 tablet at bedtime for 6-week. 8/24 patient is here today complaining of having upper abdominal pain.  Symptoms of gastritis and reflux.  Patient also claimed that recently visited his country in [place] and was infected with dengue with complication with elevated liver enzymes.  After that patient is feeling weak with loss of appetite. Patient will have CT abdomen pelvis, laboratories to check on the pancreas and liver, started on Carafate and omeprazole for gastritis.  He will call us if any change on his health status.

## 2024-08-20 LAB
A/G RATIO: 1.5
ABSOLUTE BASOPHILS: 18
ABSOLUTE EOSINOPHILS: 110
ABSOLUTE LYMPHOCYTES: 1817
ABSOLUTE MONOCYTES: 520
ABSOLUTE NEUTROPHILS: 2134
ALBUMIN: 4.3
ALKALINE PHOSPHATASE: 66
ALT (SGPT): 28
AMYLASE: 57
AST (SGOT): 18
BASOPHILS: 0.4
BILIRUBIN, TOTAL: 0.4
BUN/CREATININE RATIO: (no result)
BUN: 11
CALCIUM: 9.3
CARBON DIOXIDE, TOTAL: 30
CHLORIDE: 101
CREATININE: 0.75
EGFR: 111
EOSINOPHILS: 2.4
GLOBULIN, TOTAL: 2.8
GLUCOSE: 98
HEMATOCRIT: 40.5
HEMOGLOBIN: 13.4
LIPASE: 46
LYMPHOCYTES: 39.5
MCH: 29.5
MCHC: 33.1
MCV: 89
MONOCYTES: 11.3
MPV: 11.4
NEUTROPHILS: 46.4
PLATELET COUNT: 174
POTASSIUM: 4
PROTEIN, TOTAL: 7.1
RDW: 12.2
RED BLOOD CELL COUNT: 4.55
SODIUM: 139
WHITE BLOOD CELL COUNT: 4.6

## 2024-08-21 ENCOUNTER — LAB OUTSIDE AN ENCOUNTER (OUTPATIENT)
Dept: URBAN - METROPOLITAN AREA CLINIC 63 | Facility: CLINIC | Age: 49
End: 2024-08-21

## 2024-08-23 ENCOUNTER — LAB OUTSIDE AN ENCOUNTER (OUTPATIENT)
Dept: URBAN - METROPOLITAN AREA CLINIC 60 | Facility: CLINIC | Age: 49
End: 2024-08-23

## 2024-08-23 ENCOUNTER — CLAIMS CREATED FROM THE CLAIM WINDOW (OUTPATIENT)
Dept: URBAN - METROPOLITAN AREA SURGERY CENTER 4 | Facility: SURGERY CENTER | Age: 49
End: 2024-08-23
Payer: COMMERCIAL

## 2024-08-23 ENCOUNTER — CLAIMS CREATED FROM THE CLAIM WINDOW (OUTPATIENT)
Dept: URBAN - METROPOLITAN AREA CLINIC 4 | Facility: CLINIC | Age: 49
End: 2024-08-23
Payer: COMMERCIAL

## 2024-08-23 DIAGNOSIS — K21.9 GASTRO-ESOPHAGEAL REFLUX DISEASE WITHOUT ESOPHAGITIS: ICD-10-CM

## 2024-08-23 DIAGNOSIS — K29.70 GASTRITIS, UNSPECIFIED, WITHOUT BLEEDING: ICD-10-CM

## 2024-08-23 DIAGNOSIS — K90.0 CELIAC DISEASE: ICD-10-CM

## 2024-08-23 DIAGNOSIS — K44.9 DIAPHRAGMATIC HERNIA WITHOUT OBSTRUCTION OR GANGRENE: ICD-10-CM

## 2024-08-23 DIAGNOSIS — K44.9 HIATAL HERNIA: ICD-10-CM

## 2024-08-23 DIAGNOSIS — K29.70 GASTRIC INFLAMMATION: ICD-10-CM

## 2024-08-23 PROCEDURE — 88305 TISSUE EXAM BY PATHOLOGIST: CPT | Performed by: PATHOLOGY

## 2024-08-23 PROCEDURE — 43239 EGD BIOPSY SINGLE/MULTIPLE: CPT | Performed by: INTERNAL MEDICINE

## 2024-08-23 PROCEDURE — 88313 SPECIAL STAINS GROUP 2: CPT | Performed by: PATHOLOGY

## 2024-08-23 PROCEDURE — 88342 IMHCHEM/IMCYTCHM 1ST ANTB: CPT | Performed by: PATHOLOGY

## 2024-08-23 PROCEDURE — 88312 SPECIAL STAINS GROUP 1: CPT | Performed by: PATHOLOGY

## 2024-08-23 PROCEDURE — 00731 ANES UPR GI NDSC PX NOS: CPT | Performed by: NURSE ANESTHETIST, CERTIFIED REGISTERED

## 2024-08-23 RX ORDER — OMEPRAZOLE 20 MG/1
1 CAPSULE 30 MINUTES BEFORE MORNING MEAL CAPSULE, DELAYED RELEASE ORAL ONCE A DAY
Qty: 90 CAPSULES | Refills: 0 | Status: ACTIVE | COMMUNITY
Start: 2023-11-20

## 2024-08-23 RX ORDER — FAMOTIDINE 40 MG/1
1 TABLET AT BEDTIME TABLET, FILM COATED ORAL ONCE A DAY
Status: ACTIVE | COMMUNITY

## 2024-08-23 RX ORDER — TADALAFIL 10 MG/1
1 TABLET AS NEEDED TABLET, FILM COATED ORAL ONCE A DAY
Status: ACTIVE | COMMUNITY

## 2024-08-23 RX ORDER — FAMOTIDINE 20 MG/1
1 TABLET AT BEDTIME AS NEEDED TABLET, FILM COATED ORAL ONCE A DAY
Qty: 90 TABLET | Refills: 0 | Status: ACTIVE | COMMUNITY
Start: 2023-11-20

## 2024-08-23 RX ORDER — OMEPRAZOLE 20 MG/1
1 CAPSULE 30 MINUTES BEFORE MORNING MEAL CAPSULE, DELAYED RELEASE ORAL ONCE A DAY
Qty: 90 | Refills: 0 | Status: ACTIVE | COMMUNITY

## 2024-08-23 RX ORDER — OMEPRAZOLE 20 MG/1
TAKE 1 CAPSULE 30 MINUTES BEFORE MORNING MEAL ONCE A DAY FOR 90 DAYS CAPSULE, DELAYED RELEASE ORAL
Qty: 90 EACH | Refills: 0 | Status: ACTIVE | COMMUNITY

## 2024-08-23 RX ORDER — SUCRALFATE 1 G/1
1 TABLET ON AN EMPTY STOMACH TABLET ORAL TWICE A DAY
Qty: 60 TABLET | Refills: 0 | Status: ACTIVE | COMMUNITY
Start: 2024-08-16 | End: 2024-09-15

## 2024-08-23 RX ORDER — OMEPRAZOLE 20 MG/1
1 CAPSULE 30 MINUTES BEFORE MORNING MEAL CAPSULE, DELAYED RELEASE ORAL ONCE A DAY
Qty: 30 | Refills: 0 | Status: ACTIVE | COMMUNITY
Start: 2024-08-16

## 2024-09-30 ENCOUNTER — OFFICE VISIT (OUTPATIENT)
Dept: URBAN - METROPOLITAN AREA CLINIC 60 | Facility: CLINIC | Age: 49
End: 2024-09-30

## 2024-10-15 ENCOUNTER — OFFICE VISIT (OUTPATIENT)
Dept: URBAN - METROPOLITAN AREA CLINIC 60 | Facility: CLINIC | Age: 49
End: 2024-10-15
Payer: COMMERCIAL

## 2024-10-15 VITALS
BODY MASS INDEX: 26.84 KG/M2 | HEART RATE: 67 BPM | HEIGHT: 66 IN | OXYGEN SATURATION: 98 % | TEMPERATURE: 98.7 F | RESPIRATION RATE: 20 BRPM | WEIGHT: 167 LBS | SYSTOLIC BLOOD PRESSURE: 120 MMHG | DIASTOLIC BLOOD PRESSURE: 78 MMHG

## 2024-10-15 DIAGNOSIS — K29.70 GASTRITIS, UNSPECIFIED, WITHOUT BLEEDING: ICD-10-CM

## 2024-10-15 DIAGNOSIS — K44.9 DIAPHRAGMATIC HERNIA WITHOUT OBSTRUCTION OR GANGRENE: ICD-10-CM

## 2024-10-15 DIAGNOSIS — K21.9 GASTROESOPHAGEAL REFLUX DISEASE, UNSPECIFIED WHETHER ESOPHAGITIS PRESENT: ICD-10-CM

## 2024-10-15 DIAGNOSIS — K90.0 CELIAC DISEASE: ICD-10-CM

## 2024-10-15 PROBLEM — 235595009: Status: ACTIVE | Noted: 2024-10-15

## 2024-10-15 PROBLEM — 396331005: Status: ACTIVE | Noted: 2024-10-15

## 2024-10-15 PROCEDURE — 99214 OFFICE O/P EST MOD 30 MIN: CPT | Performed by: NURSE PRACTITIONER

## 2024-10-15 RX ORDER — TADALAFIL 10 MG/1
1 TABLET AS NEEDED TABLET, FILM COATED ORAL ONCE A DAY
Status: ACTIVE | COMMUNITY

## 2024-10-15 RX ORDER — OMEPRAZOLE 20 MG/1
1 CAPSULE 30 MINUTES BEFORE MORNING MEAL CAPSULE, DELAYED RELEASE ORAL ONCE A DAY
Qty: 30 | Refills: 0 | Status: ACTIVE | COMMUNITY
Start: 2024-08-16

## 2024-10-15 RX ORDER — FAMOTIDINE 20 MG/1
TAKE 1 TABLET TABLET, FILM COATED ORAL ONCE A DAY
Qty: 90 | Refills: 3 | OUTPATIENT
Start: 2024-10-17

## 2024-10-15 RX ORDER — SUCRALFATE 1 G/1
1 TABLET ON AN EMPTY STOMACH TABLET ORAL TWICE A DAY
Status: ACTIVE | COMMUNITY

## 2024-10-15 NOTE — HPI-HPI
11/19 Patient comes for EGD, and colonoscopy with personal h/o GERD  and   had colonoscopy every 2 years, Patient denies rectal bleeding or colonic polyps. Will need records, will plan EGD and colonoscopy. Will start diet and PPI. 12/19 Patient colonoscopy with evidence of mild diverticular disease of the sigmoid colon, internal hemorrhoids, the examined portion of the ileum was normal, and the entire examined colon is normal.  Biopsy: Random colon biopsy  mucosa with no specific pathologic alteration, terminal ileum biopsy, small intestinal mucosa with no specific pathologic alteration.  EGD with evidence of a small H hernia.  Duodenal mucosa with normal with no specific pathologic alteration.  Antrum, body, and stomach biopsy showed mild chronic inflammation, negative for H. pylori.  Lower esophageal mucosa with features of reflux, negative for intestinal metaplastic. Patient will do diet and treatment below.  Patient will do diet and treatment below.  3/20 Patient here today in good general state he is complaining of having gastritis and reflux symptoms.  Patient admits not been doing diet or proper treatment for gastritis or GERD. Plan: Patient is agreed to resume treatment with Carafate 1 g twice a day, and omeprazole 20 mg once a day, he will do proper diet for gastritis and reflux. 2/23 Patient here today complaining of having difficulty to swallow.  Patient started having some candidiasis infection around 2 months ago.  Received treatment for it and symptoms subside.  After that treatment he noticed neck pain, chest pain and difficulty to swallow.  He has an EGD done 3 years ago the procedure shows evidence of small hiatal hernia, mild chronic inflammation of the stomach, lower esophageal mucosa with features of reflux, negative for intestinal metaplasia.  He is also complaining of symptoms of reflux and gastritis.  On omeprazole 20 mg daily with no resolution of his symptoms. We will do EGD, resume diet and treatment with omeprazole 20 mg once a day, and we will add Carafate 1 g  twice a day.  Neck ultrasound and esophagram.  3/23   Patient EGD shows evidence esophageal mucosal changes suspicious for short segment of [place] esophagus, small hiatal hernia, chronic gastritis biopsy results duodenal mucosa with no significant abnormality.  Stomach, antrum, and body biopsy negative for H. pylori organism or intestinal metaplasia, dysplasia, malignancy.  Positive for foveolar hyperplasia.  No other esophageal mucosa with reflux changes, negative for [place]'s esophagus or eosinophilic esophagitis Neck ultrasound shows evidence of no enlarged cervical nodes, multiple thyroid. 6/23 Patient also is complaining of having patient here today in good general state.  He says symptoms of gastritis and reflux are better still having dysphagia and right-sided neck pain/discomfort especially when he swallows.  Patient has visited ENT he was told that no abnormalities were found at the physical exam.  Barium swallow shows evidence of normal barium esophagram.  Patient also here for his screening colonoscopy.  He never had colonoscopy done denies any personal family history of colorectal cancer, rectal bleeding or change in her bowel habits. We do neck MRI with contrast continue with treatment for GERD and gastritis with diet, Carafate, and omeprazole. Colonoscopy. 9/23 Patient colonoscopy shows evidence of a 5 mm tubular adenoma polyp into the ascending colon, a 5 mm tubular adenoma polyp into the transverse colon, mild diverticular disease of the sigmoid and hepatic flexure, and internal hemorrhoids.  EGD shows evidence of esophageal mucosa changes suspicious for short segment of [place] esophagus, small hiatal hernia, chronic gastritis, normal esophagus and examined duodenum.  Biopsy results: Duodenal mucosa with no significant abnormality.  Stomach, antrum body biopsy foveolar hyperplasia, negative for H. pylori, intestinal metaplasia, dysplasia, or malignancy.  Lower third esophageal mucosa with reflux type changes, negative for [place] esophagus, dysplasia, malignancy.  Esophagus middle third biopsy also, shows evidence of squamous mucosa with reflux type changes negative for [place] esophagus, dysplasia, malignancy.  Patient is here complaining of symptom of gastritis.  Epigastric pain that radiate to the back.  He will have laboratory to check on pancreas and liver function.  We may do CT abdomen upon patient clinical course and laboratory results.  Patient will continue with diet and treatment for gastritis and reflux. Colonoscopy in 5 year. 11/23 Patient here today in good general state.  He is here complaining of symptom of gastritis and reflux.  The patient in long term use of PPI.  Has EGD done recently  (please read above) patient will resume diet and treatment for gastritis and reflux.  Will DC Carafate in 4 weeks continue with famotidine 20 mg 1 tablet at bedtime for 6-week. 8/24 patient is here today complaining of having upper abdominal pain.  Symptoms of gastritis and reflux.  Patient also claimed that recently visited his country in [place] and was infected with dengue with complication with elevated liver enzymes.  After that patient is feeling weak with loss of appetite. Patient will have CT abdomen pelvis, laboratories to check on the pancreas and liver, started on Carafate and omeprazole for gastritis.  He will call us if any change on his health status.  Patient CT abdomen and pelvis was negative for any acute intra-abdominal pelvis pathology.  10/24 Patient's Stool studies were negative.  CT abdomen and pelvis were negative for any acute intra-abdominal pelvic pathology.  Normal amylase lipase CBC and CMP, EGD shows evidence of small hiatal hernia, chronic gastritis, normal esophagus and examined duodenum.  Biopsy results show evidence of mild villous blunting with prominent intraepithelial lymphocytes, consistent with sprue. Patient will do diet for celiac disease, will refer him with a dietitian, serology study.

## 2024-10-31 ENCOUNTER — TELEPHONE ENCOUNTER (OUTPATIENT)
Dept: URBAN - METROPOLITAN AREA CLINIC 64 | Facility: CLINIC | Age: 49
End: 2024-10-31

## 2025-01-07 ENCOUNTER — OFFICE VISIT (OUTPATIENT)
Dept: URBAN - METROPOLITAN AREA CLINIC 60 | Facility: CLINIC | Age: 50
End: 2025-01-07